# Patient Record
Sex: FEMALE | Race: WHITE | NOT HISPANIC OR LATINO | URBAN - METROPOLITAN AREA
[De-identification: names, ages, dates, MRNs, and addresses within clinical notes are randomized per-mention and may not be internally consistent; named-entity substitution may affect disease eponyms.]

---

## 2019-11-24 ENCOUNTER — OUTPATIENT (OUTPATIENT)
Dept: EMERGENCY DEPT | Facility: HOSPITAL | Age: 29
LOS: 1 days | Discharge: HOME | End: 2019-11-24

## 2019-11-24 VITALS
OXYGEN SATURATION: 100 % | DIASTOLIC BLOOD PRESSURE: 85 MMHG | TEMPERATURE: 98 F | HEART RATE: 100 BPM | RESPIRATION RATE: 15 BRPM | SYSTOLIC BLOOD PRESSURE: 138 MMHG

## 2019-11-24 VITALS
HEART RATE: 74 BPM | RESPIRATION RATE: 18 BRPM | OXYGEN SATURATION: 99 % | DIASTOLIC BLOOD PRESSURE: 55 MMHG | SYSTOLIC BLOOD PRESSURE: 87 MMHG

## 2019-11-24 DIAGNOSIS — O20.9 HEMORRHAGE IN EARLY PREGNANCY, UNSPECIFIED: ICD-10-CM

## 2019-11-24 DIAGNOSIS — O02.1 MISSED ABORTION: ICD-10-CM

## 2019-11-24 LAB
ALBUMIN SERPL ELPH-MCNC: 4.4 G/DL — SIGNIFICANT CHANGE UP (ref 3.3–5)
ALP SERPL-CCNC: 31 U/L — LOW (ref 40–120)
ALT FLD-CCNC: 7 U/L — SIGNIFICANT CHANGE UP (ref 4–33)
ANION GAP SERPL CALC-SCNC: 15 MMO/L — HIGH (ref 7–14)
APTT BLD: 27.9 SEC — SIGNIFICANT CHANGE UP (ref 27.5–36.3)
AST SERPL-CCNC: 11 U/L — SIGNIFICANT CHANGE UP (ref 4–32)
BASOPHILS # BLD AUTO: 0.04 K/UL — SIGNIFICANT CHANGE UP (ref 0–0.2)
BASOPHILS NFR BLD AUTO: 0.6 % — SIGNIFICANT CHANGE UP (ref 0–2)
BILIRUB SERPL-MCNC: 0.3 MG/DL — SIGNIFICANT CHANGE UP (ref 0.2–1.2)
BLD GP AB SCN SERPL QL: NEGATIVE — SIGNIFICANT CHANGE UP
BUN SERPL-MCNC: 7 MG/DL — SIGNIFICANT CHANGE UP (ref 7–23)
CALCIUM SERPL-MCNC: 8.8 MG/DL — SIGNIFICANT CHANGE UP (ref 8.4–10.5)
CHLORIDE SERPL-SCNC: 108 MMOL/L — HIGH (ref 98–107)
CO2 SERPL-SCNC: 18 MMOL/L — LOW (ref 22–31)
CREAT SERPL-MCNC: 0.72 MG/DL — SIGNIFICANT CHANGE UP (ref 0.5–1.3)
EOSINOPHIL # BLD AUTO: 0.04 K/UL — SIGNIFICANT CHANGE UP (ref 0–0.5)
EOSINOPHIL NFR BLD AUTO: 0.6 % — SIGNIFICANT CHANGE UP (ref 0–6)
GLUCOSE SERPL-MCNC: 99 MG/DL — SIGNIFICANT CHANGE UP (ref 70–99)
HCG SERPL-ACNC: 5039 MIU/ML — SIGNIFICANT CHANGE UP
HCT VFR BLD CALC: 33.6 % — LOW (ref 34.5–45)
HGB BLD-MCNC: 11.1 G/DL — LOW (ref 11.5–15.5)
IMM GRANULOCYTES NFR BLD AUTO: 0.3 % — SIGNIFICANT CHANGE UP (ref 0–1.5)
INR BLD: 1.17 — SIGNIFICANT CHANGE UP (ref 0.88–1.17)
LYMPHOCYTES # BLD AUTO: 1.52 K/UL — SIGNIFICANT CHANGE UP (ref 1–3.3)
LYMPHOCYTES # BLD AUTO: 24.1 % — SIGNIFICANT CHANGE UP (ref 13–44)
MCHC RBC-ENTMCNC: 30 PG — SIGNIFICANT CHANGE UP (ref 27–34)
MCHC RBC-ENTMCNC: 33 % — SIGNIFICANT CHANGE UP (ref 32–36)
MCV RBC AUTO: 90.8 FL — SIGNIFICANT CHANGE UP (ref 80–100)
MONOCYTES # BLD AUTO: 0.33 K/UL — SIGNIFICANT CHANGE UP (ref 0–0.9)
MONOCYTES NFR BLD AUTO: 5.2 % — SIGNIFICANT CHANGE UP (ref 2–14)
NEUTROPHILS # BLD AUTO: 4.37 K/UL — SIGNIFICANT CHANGE UP (ref 1.8–7.4)
NEUTROPHILS NFR BLD AUTO: 69.2 % — SIGNIFICANT CHANGE UP (ref 43–77)
NRBC # FLD: 0 K/UL — SIGNIFICANT CHANGE UP (ref 0–0)
PLATELET # BLD AUTO: 217 K/UL — SIGNIFICANT CHANGE UP (ref 150–400)
PMV BLD: 9.9 FL — SIGNIFICANT CHANGE UP (ref 7–13)
POTASSIUM SERPL-MCNC: 3.8 MMOL/L — SIGNIFICANT CHANGE UP (ref 3.5–5.3)
POTASSIUM SERPL-SCNC: 3.8 MMOL/L — SIGNIFICANT CHANGE UP (ref 3.5–5.3)
PROT SERPL-MCNC: 6.6 G/DL — SIGNIFICANT CHANGE UP (ref 6–8.3)
PROTHROM AB SERPL-ACNC: 13.4 SEC — HIGH (ref 9.8–13.1)
RBC # BLD: 3.7 M/UL — LOW (ref 3.8–5.2)
RBC # FLD: 12.2 % — SIGNIFICANT CHANGE UP (ref 10.3–14.5)
RH IG SCN BLD-IMP: POSITIVE — SIGNIFICANT CHANGE UP
SODIUM SERPL-SCNC: 141 MMOL/L — SIGNIFICANT CHANGE UP (ref 135–145)
WBC # BLD: 6.32 K/UL — SIGNIFICANT CHANGE UP (ref 3.8–10.5)
WBC # FLD AUTO: 6.32 K/UL — SIGNIFICANT CHANGE UP (ref 3.8–10.5)

## 2019-11-24 RX ORDER — ACETAMINOPHEN 500 MG
975 TABLET ORAL EVERY 6 HOURS
Refills: 0 | Status: DISCONTINUED | OUTPATIENT
Start: 2019-11-24 | End: 2019-11-24

## 2019-11-24 RX ORDER — IBUPROFEN 200 MG
1 TABLET ORAL
Qty: 0 | Refills: 0 | DISCHARGE
Start: 2019-11-24

## 2019-11-24 RX ORDER — ACETAMINOPHEN 500 MG
3 TABLET ORAL
Qty: 0 | Refills: 0 | DISCHARGE
Start: 2019-11-24

## 2019-11-24 RX ORDER — IBUPROFEN 200 MG
600 TABLET ORAL EVERY 6 HOURS
Refills: 0 | Status: DISCONTINUED | OUTPATIENT
Start: 2019-11-24 | End: 2019-11-24

## 2019-11-24 NOTE — ASU DISCHARGE PLAN (ADULT/PEDIATRIC) - CALL YOUR DOCTOR IF YOU HAVE ANY OF THE FOLLOWING:
Excessive diarrhea/Fever greater than (need to indicate Fahrenheit or Celsius)/Unable to urinate/Nausea and vomiting that does not stop/Increased irritability or sluggishness/Bleeding that does not stop/Pain not relieved by Medications/Inability to tolerate liquids or foods/Swelling that gets worse/Wound/Surgical Site with redness, or foul smelling discharge or pus/Numbness, tingling, color or temperature change to extremity Inability to tolerate liquids or foods/Fever greater than (need to indicate Fahrenheit or Celsius)/Wound/Surgical Site with redness, or foul smelling discharge or pus/Nausea and vomiting that does not stop/Unable to urinate/Bleeding that does not stop/Pain not relieved by Medications/Excessive diarrhea

## 2019-11-24 NOTE — H&P ADULT - HISTORY OF PRESENT ILLNESS
pt is a 28YO  w/ confirmed MAB in office at [facility] 9-10wks presenting w/ vaginal bleeding. Pt said she filled 2 pads overnight and passed small clots. She denies seeing any fetal tissue. Bleeding improved upon arrival to ED. Patient is otherwise asymptomatic denies HA, dizziness, lightheadedness, NVD, CP, SOB, palpitations, abdominal pain, vaginal discharge. As per Dr. lucas. Patient had confirmed MAB that was seen in the office. Patient would like a D&C to removal products.    OBHx: 3x   GY: neg  PMH: Neg  PSH: Neg  Meds: None  All: NKDA  Psych: neg  Soc: neg  FHx: neg

## 2019-11-24 NOTE — ED PROVIDER NOTE - CLINICAL SUMMARY MEDICAL DECISION MAKING FREE TEXT BOX
Young healthy female with no sig pmhs, confirmed 10 week iup, pw vaginal bleeding and cramping. Most likely miscarriage. labs, beta hcg, UA, type and screen, transvaginal us.

## 2019-11-24 NOTE — H&P ADULT - PROBLEM SELECTOR PLAN 1
Plan  -CBC, Coags, T&S w/ confirmatory  -TVUS deferred by Dr. joaquin as he recently did one in office  -Monitor vitals  -Pad counts  -LR@125  -Plan to add on to OR scheduled for D&C    Jimenez pgy2 d/w Dr. Joaquin

## 2019-11-24 NOTE — H&P ADULT - ASSESSMENT
Pt is a 28YO  presenting w/ RICARDO @ approxiamtely 9-10wks. Patient's vitals stable with no active bleeding. patient has confirmed MAB w/ Dr. Joaquin at his office and would like a D&C to remove products.

## 2019-11-24 NOTE — ASU DISCHARGE PLAN (ADULT/PEDIATRIC) - CARE PROVIDER_API CALL
Brad Joaquin)  Obstetrics and Gynecology  [address] [place] [address], [address] [place] [address]  Phone: [phone]  Fax: [phone]  Follow Up Time:

## 2019-11-24 NOTE — ED PROVIDER NOTE - PHYSICAL EXAMINATION
General: Patient awake alert NAD.   HEENT: normocephalic, atraumatic, EOMI, no scleral icterus.   Cardiac: RRR, S1, S2, no murmur, rubs, gallop.   LUNGS: CTA B/L no wheeze, rhonchi, rales.   Abdomen: soft NT, ND, no rebound no guarding, no CVA tenderness.   Pelvic: chaperoned by KLAUDIA James, + moderate dark red blood with clots in vaginal canal. os closed.   EXT: Moving all extremities, no edema.    Neuro: A&Ox3, gait normal, no focal neurological deficits, CN 2-12 grossly intact  Skin: warm, dry, no rash, no lesions

## 2019-11-24 NOTE — ED PROVIDER NOTE - OBJECTIVE STATEMENT
29 F  10 weeks with confirmed IUP (OB Dr. Joaquin), pw vaginal bleeding and pelvic cramping this AM. Pt noted vaginal spotting 2 days ago. Pelvic cramping "feels like a period". Bleeding is mild, not soaking pads. No other complaints.

## 2019-11-24 NOTE — ED PROVIDER NOTE - ATTENDING CONTRIBUTION TO CARE
DR. LAY, ATTENDING MD-  I performed a face to face bedside interview with the patient regarding history of present illness, review of symptoms and past medical history. I completed an independent physical exam.  I have discussed the patient's plan of care with the resident.   Documentation as above in the note.    28 y/o female 10 wks preg with abd cramping, vag bld x3 days.  Worsening today.  Denies f/c, ha, neck stiffness, cp, sob, cough, n/v/d, dysuria, rash.  Afebrile, vs wnl, anxious, ctabil, s1s2 rrr no m/r/g, abd soft mild suprapubic ttp no r/g, no cva tenderness b/l, no leg swelling b/l, no rash.  Eval for threatened ab vs missed vs inevitable.  Obtain cbc, bmp, t&s, coags, tvus, consult ob.

## 2019-11-24 NOTE — ED PROVIDER NOTE - PROGRESS NOTE DETAILS
Ana Altman M.D. Resident  Pt's OB spoke to Dr. Montana and requested admission to take pt to OR, requesting cancel transvaginal ultrasound. coags ordered, OB paged

## 2019-11-24 NOTE — H&P ADULT - NSHPPHYSICALEXAM_GEN_ALL_CORE
Gen: NAD, A&Ox3  Abd: Soft, NT, ND, no guarding/rebound  Spec: Minimal blood in vault, no active bleeding from os  VE: Os dilated to approxiamtely 0.5cm, no CMT

## 2019-11-24 NOTE — ED ADULT TRIAGE NOTE - CHIEF COMPLAINT QUOTE
alert  states she is 10 weeks pregnant c/o vaginal bleeding  started friday became heavy last night   no c/o chest pain dizziness or SOB

## 2019-11-24 NOTE — ED ADULT NURSE NOTE - OBJECTIVE STATEMENT
Pt arrives for new onset vaginal bleeding while 10wks pregnant. Pt states she noticed light spotting Friday without cramping. She then reports in the middle of the night tonight she noticed she had bright kel blood from her vagina with cramping. Current bleeding is less than 1 pad per hour;  her pad from home she states she placed at 0600 had minimal blood on it; no clots present.  at bedside. I chaperoned MD during pelvic exam. Pt appears anixous and tearful

## 2019-11-24 NOTE — ED ADULT NURSE REASSESSMENT NOTE - NS ED NURSE REASSESS COMMENT FT1
report received from night KLAUDIA Velásquez. pt alert and responsive, A+Ox3, resting comfortably. reports mild cramping. IVSL in place. labs sent. pt going to OR, obgyn resident at bedside for eval. pt instructed to give all belongings and valuables to spouse. report given to PACu KLAUDIA Pearson. pt awaiting transport.

## 2019-11-24 NOTE — ED PROVIDER NOTE - NS ED ROS FT
GENERAL: No fever, chills  EYES: no vision changes, no discharge.   HEENT: no difficulty swallowing or speaking   CARDIAC: no chest pain/pressure, SOB, lower ex edema  PULMONARY: no cough, SOB  GI: no abdominal pain, n/v/d/c  : + pelvic pain, abd cramping, no dysuria, frequency, hematuria  SKIN: no rashes, lesions, vesicles  NEURO: no headache, lightheadedness, paraesthesias.   MSK: No joint pain, myalgia, weakness.

## 2019-11-27 LAB — SURGICAL PATHOLOGY STUDY: SIGNIFICANT CHANGE UP

## 2022-01-08 ENCOUNTER — HOSPITAL ENCOUNTER (EMERGENCY)
Dept: HOSPITAL 74 - JER | Age: 32
Discharge: HOME | End: 2022-01-08
Payer: COMMERCIAL

## 2022-01-08 VITALS — SYSTOLIC BLOOD PRESSURE: 108 MMHG | TEMPERATURE: 97.7 F | DIASTOLIC BLOOD PRESSURE: 67 MMHG | HEART RATE: 77 BPM

## 2022-01-08 VITALS — BODY MASS INDEX: 25 KG/M2
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
## 2022-01-08 DIAGNOSIS — S61.211A: Primary | ICD-10-CM

## 2022-01-08 PROCEDURE — 0HQGXZZ REPAIR LEFT HAND SKIN, EXTERNAL APPROACH: ICD-10-PCS

## 2022-01-24 NOTE — ED PROVIDER NOTE - NS HIV RISK FACTOR YES
Valtrex Counseling: I discussed with the patient the risks of valacyclovir including but not limited to kidney damage, nausea, vomiting and severe allergy.  The patient understands that if the infection seems to be worsening or is not improving, they are to call. Declined

## 2022-02-22 NOTE — ED PROVIDER NOTE - MDM ORDERS SUBMITTED SELECTION
to use phone.   Labs